# Patient Record
Sex: MALE | Race: WHITE | Employment: OTHER | URBAN - METROPOLITAN AREA
[De-identification: names, ages, dates, MRNs, and addresses within clinical notes are randomized per-mention and may not be internally consistent; named-entity substitution may affect disease eponyms.]

---

## 2018-03-12 ENCOUNTER — OFFICE VISIT (OUTPATIENT)
Dept: NON INVASIVE DIAGNOSTICS | Age: 68
End: 2018-03-12
Payer: MEDICARE

## 2018-03-12 VITALS
SYSTOLIC BLOOD PRESSURE: 128 MMHG | DIASTOLIC BLOOD PRESSURE: 80 MMHG | BODY MASS INDEX: 31.64 KG/M2 | HEIGHT: 70 IN | WEIGHT: 221 LBS | HEART RATE: 70 BPM

## 2018-03-12 DIAGNOSIS — I49.3 SYMPTOMATIC PVCS: Primary | ICD-10-CM

## 2018-03-12 DIAGNOSIS — I47.29 NSVT (NONSUSTAINED VENTRICULAR TACHYCARDIA): ICD-10-CM

## 2018-03-12 DIAGNOSIS — R07.89 CHEST PAIN, ATYPICAL: ICD-10-CM

## 2018-03-12 DIAGNOSIS — I10 ESSENTIAL HYPERTENSION: ICD-10-CM

## 2018-03-12 DIAGNOSIS — E66.09 NON MORBID OBESITY DUE TO EXCESS CALORIES: ICD-10-CM

## 2018-03-12 PROCEDURE — 99213 OFFICE O/P EST LOW 20 MIN: CPT | Performed by: INTERNAL MEDICINE

## 2018-03-12 PROCEDURE — 93000 ELECTROCARDIOGRAM COMPLETE: CPT | Performed by: INTERNAL MEDICINE

## 2018-03-12 NOTE — LETTER
[facility] Electrophysiology  [address] [place] [id number]  Phone: [phone]  Fax: [phone]    No ref. provider found        March 12, 2018       Patient: Viky Davis   MR Number: <[id number]>   YOB: 1950   Date of Visit: 3/12/2018       Dear Dr. Stephanie Castro ref. provider found: Thank you for the request for consultation for Viky Davis to me for the evaluation of PVCs. Below are the relevant portions of my assessment and plan of care. Assessment: This is a 80 y.o. male with a history of HTN, dyslipidemia who has had palpitations since 2014. He was noted to have \"low HR\" on his monitor\" he had a workup with Dr. Christy Alfaro at [facility]. He recommended some beta blocker and holter monitoring. Pt wished to consider catheter ablation, recently evaluated by myself on  3/16/2016, Holter monitor ordered to assess exact PVC burden. 4/11/2016 24 hr Holter monitor revealing a PVC burden of 14.7%. Repeat monitor now in 7/16 shows PVC burden ~36%, essesntially monomorphic. Repeat holter 5/2017 now < 1%. Patient is still having PVC's which he states he notices when he takes his HR and BP at home, and complains of pulsations in his neck and chest pain at times even at rest. He currently is still on Toprol 25mg daily. Discussed with the patient about re-wearing a event monitor to determine PVC burden. Also discussed with the patient about treatment options regarding PVC's including catheter ablation and AAD therapy.     1. Non-sustained ventricular tachycardia and frequent PVCs  - max of triplets   - 24 hr Holter monitor 4/11/2016: 14.7% burden  - previously on atenolol with increase in PVCs;  Changed to Toprol XL   -  C-MRI to R/O ARVD/infiltrative CM due to slightly unusual axis of NSVT which is 1 major criteria for ARVC, no clear echo changes to RV, no epsilon waves or TWI, his Brother's death was >age 35 and COULD have been related to CAD; MRI obtained 8/2016

## 2018-03-12 NOTE — PROGRESS NOTES
48 hour holter monitor ordered today by Dr. Kelsey Rachel. Monitor was applied, instructions given. Patient verbalized understanding.

## 2018-03-15 ENCOUNTER — HOSPITAL ENCOUNTER (OUTPATIENT)
Dept: CARDIOLOGY | Age: 68
Discharge: HOME | End: 2018-03-15
Payer: MEDICARE

## 2018-03-15 VITALS
SYSTOLIC BLOOD PRESSURE: 120 MMHG | HEIGHT: 70 IN | BODY MASS INDEX: 31.64 KG/M2 | DIASTOLIC BLOOD PRESSURE: 74 MMHG | HEART RATE: 84 BPM | WEIGHT: 221 LBS

## 2018-03-15 DIAGNOSIS — I49.3 SYMPTOMATIC PVCS: ICD-10-CM

## 2018-03-15 PROCEDURE — 93017 CV STRESS TEST TRACING ONLY: CPT

## 2018-03-21 DIAGNOSIS — I49.3 SYMPTOMATIC PVCS: ICD-10-CM

## 2018-03-28 ENCOUNTER — TELEPHONE (OUTPATIENT)
Dept: NON INVASIVE DIAGNOSTICS | Age: 68
End: 2018-03-28

## 2018-03-28 DIAGNOSIS — I47.29 NSVT (NONSUSTAINED VENTRICULAR TACHYCARDIA): ICD-10-CM

## 2018-03-28 DIAGNOSIS — I10 ESSENTIAL HYPERTENSION: ICD-10-CM

## 2018-03-28 DIAGNOSIS — I49.3 PVC'S (PREMATURE VENTRICULAR CONTRACTIONS): ICD-10-CM

## 2018-03-28 RX ORDER — METOPROLOL SUCCINATE 25 MG/1
25 TABLET, EXTENDED RELEASE ORAL 2 TIMES DAILY
Qty: 180 TABLET | Refills: 3 | Status: SHIPPED | OUTPATIENT
Start: 2018-03-28

## 2018-03-28 NOTE — TELEPHONE ENCOUNTER
Spoke with patient. Ongoing PVCs. He is NOT willing to consider AADs or ablation. Will increase toprol XL to 25mg BID  Recommend ongoing close BP monitoring. He will f/u in his new location unless he changes his mind. All questions answered. PVC burden only 13-14% thus PVC induced CM is low risk, treatment for symptoms mgmt. Stress was unremarkable for ischemia. Lower PVC burden with exercise.     Marco A Rico MD, [facility]  Cardiac Electrophysiology  [facility]) Physicians  [facility] [place]  4:20 PM  3/28/2018